# Patient Record
Sex: FEMALE | Race: WHITE | Employment: STUDENT | URBAN - METROPOLITAN AREA
[De-identification: names, ages, dates, MRNs, and addresses within clinical notes are randomized per-mention and may not be internally consistent; named-entity substitution may affect disease eponyms.]

---
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
## 2025-01-01 RX ORDER — FLUOXETINE 10 MG/1
10 CAPSULE ORAL DAILY
Qty: 90 CAPSULE | Refills: 1 | Status: SHIPPED | OUTPATIENT
Start: 2025-01-01

## 2025-02-07 ENCOUNTER — PATIENT MESSAGE (OUTPATIENT)
Dept: FAMILY MEDICINE CLINIC | Facility: CLINIC | Age: 20
End: 2025-02-07

## 2025-04-25 ENCOUNTER — OFFICE VISIT (OUTPATIENT)
Dept: FAMILY MEDICINE CLINIC | Facility: CLINIC | Age: 20
End: 2025-04-25
Payer: COMMERCIAL

## 2025-04-25 VITALS
WEIGHT: 163 LBS | HEIGHT: 65.5 IN | BODY MASS INDEX: 26.83 KG/M2 | SYSTOLIC BLOOD PRESSURE: 120 MMHG | OXYGEN SATURATION: 100 % | DIASTOLIC BLOOD PRESSURE: 70 MMHG | TEMPERATURE: 99 F | HEART RATE: 82 BPM

## 2025-04-25 DIAGNOSIS — E55.9 VITAMIN D DEFICIENCY: ICD-10-CM

## 2025-04-25 DIAGNOSIS — Z00.00 ROUTINE HEALTH MAINTENANCE: Primary | ICD-10-CM

## 2025-04-25 DIAGNOSIS — F41.1 GAD (GENERALIZED ANXIETY DISORDER): ICD-10-CM

## 2025-04-25 DIAGNOSIS — B36.0 TINEA VERSICOLOR: ICD-10-CM

## 2025-04-25 PROCEDURE — 99395 PREV VISIT EST AGE 18-39: CPT | Performed by: FAMILY MEDICINE

## 2025-04-25 PROCEDURE — 99213 OFFICE O/P EST LOW 20 MIN: CPT | Performed by: FAMILY MEDICINE

## 2025-04-25 RX ORDER — FLUOXETINE 10 MG/1
10 CAPSULE ORAL DAILY
Qty: 90 CAPSULE | Refills: 1 | Status: SHIPPED | OUTPATIENT
Start: 2025-04-25

## 2025-04-25 NOTE — PROGRESS NOTES
The following individual(s) verbally consented to be recorded using ambient AI listening technology and understand that they can each withdraw their consent to this listening technology at any point by asking the clinician to turn off or pause the recording:    Patient name: Radha Rikki  Additional names:

## 2025-04-25 NOTE — PROGRESS NOTES
SUBJECTIVE:  Chief Complaint   Patient presents with    Physical     Physical.   Patient having sweating rash between and under breasts.   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  Wondering also what sunscreen to use.     HPI:  History of Present Illness  Radha Brandt is a 19 year old female who presents for a well woman exam and follow-up on chronic medications.    She is planning to travel to [place] in June to work at [employer] for six months and is concerned about staying protected and ensuring her vaccinations are up to date, especially since she will be living with other people.    She has been taking fluoxetine for generalized anxiety disorder, which is working excellently, and her anxiety is well controlled. She receives her medication through [facility], a three-month prescription service, and is considering options for obtaining her medication while away. She experienced a lapse in medication over Christmas, which did not go well, emphasizing the importance of maintaining her regimen.    She is up to date on her varicella and meningitis vaccines, having received the meningitis vaccine in 2023. She inquires about the meningitis B vaccine, which is not required but recommended for those living in college dorms, due to her upcoming living situation.    She experiences a recurrent rash similar to one her sibling, Clark, has. It is described as a heat rash, and she has been using dandruff shampoo in the shower as a treatment, though she has not been leaving it on for the recommended duration. She is considering additional treatment options to manage this condition.    She has a family history of colon cancer, with her mother having had it and multiple relatives on her grandmother's side also affected.    Health Maintenance:  Vaccines: reviewed as below. Indicated today: men B  Immunization History   Administered Date(s) Administered    >=3 YRS FLUZONE OR FLUARIX QUAD PRESERVE FREE  SINGLE DOSE (45426) FLU CLINIC 01/18/2018    Covid-19 Vaccine Pfizer 30 mcg/0.3 ml 05/13/2021, 06/03/2021, 12/17/2021    Covid-19 Vaccine Pfizer Bivalent 30mcg/0.3mL 10/13/2022    DTAP 03/20/2006, 05/16/2006, 07/24/2006, 08/06/2007, 08/06/2007, 03/30/2011    FLULAVAL 6 months & older 0.5 ml Prefilled syringe (51693) 10/19/2018, 09/24/2019, 10/02/2020, 10/13/2022    FLUZONE 6 months and older PFS 0.5 ml ([id number]) 01/18/2018, 10/19/2018, 09/24/2019, 10/02/2020, 11/25/2023    HEP A 12/15/2009, 07/01/2010    HEP B 03/20/2006, 05/16/2006, 07/24/2006    HIB 03/20/2006, 05/16/2006, 07/24/2006, 08/06/2007    Hib, Unspecified Formulation 03/20/2006, 05/16/2006, 07/24/2006, 08/06/2007    Hpv Virus Vaccine 9 Neli Im 05/26/2017, 12/04/2017    IPV 03/20/2006, 05/16/2006, 07/24/2006, 03/30/2011    Influenza 12/13/2007, 11/04/2008, 09/28/2009, 12/14/2010, 11/14/2011, 10/19/2018, 09/24/2019, 10/02/2020, 10/26/2021    Influenza Virus Vaccine, H1N1 01/01/2009    MMR 08/06/2007, 03/20/2011    Meningococcal-Menactra 05/26/2017    Meningococcal-Menveo 2month-55yr 06/22/2023    Moderna Covid-19 Vaccine 50mcg/0.5ml 12yrs+ 11/25/2023    Pneumococcal (Prevnar 13) 03/20/2006, 05/16/2006, 07/24/2006, 01/26/2007, 07/01/2010    Pneumococcal (Prevnar 7) 03/20/2006, 05/16/2006, 07/24/2006, 01/26/2007    TDAP 05/26/2017    Varicella 01/26/2007, 03/30/2011   Pended Date(s) Pended    Covid-19 Vaccine Pfizer Bivalent 30mcg/0.3mL 10/03/2022    FLULAVAL 6 months & older 0.5 ml Prefilled syringe ([id number]) 10/03/2022     Obesity screening: Body mass index is 26.71 kg/m².  Diabetes screening:  No results found for: \"EAG\", \"A1C\"   Hypercholesterolemia screening: due    Depression screen: as above  Osteoporosis: No history of pathologic fractures. No steroid use, smoking, risk of falls, excessive alcohol use.  Cervical Cancer screening: Last Pap: n/a   Colon Cancer screening: Family history of colon cancer? Yes- maternal grandmother; Last colonoscopy: -   Breast  Cancer screening: Family history of breast cancer? no Last mammogram: -   STI: Desires testing for STIs? no  Tobacco use:  reports that she has never smoked. She has never used smokeless tobacco.    ROS: Negative unless stated above    HISTORY:  Past Medical History[1]   Past Surgical History[2]   Family History[3]   Short Social Hx on File[4]     Allergies:  Allergies[5]    OBJECTIVE:  PHYSICAL EXAM:  Vitals:    04/25/25 1027   BP: 120/70   BP Location: Left arm   Patient Position: Sitting   Cuff Size: large   Pulse: 82   Temp: 98.6 °F (37 °C)   TempSrc: Oral   SpO2: 100%   Weight: 163 lb (73.9 kg)   Height: 5' 5.5\" (1.664 m)     Physical Examination: General appearance - alert, well appearing, and in no distress and normal appearing weight  Mental status - alert, oriented to person, place, and time, normal mood, behavior, speech, dress, motor activity, and thought processes  Ears - bilateral TM's and external ear canals normal  Neck - supple, no significant adenopathy  Chest - clear to auscultation, no wheezes, rales or rhonchi, symmetric air entry  Heart - normal rate, regular rhythm, normal S1, S2, no murmurs, rubs, clicks or gallops  Abdomen - soft, nontender, nondistended, no masses or organomegaly  Extremities - peripheral pulses normal, no pedal edema, no clubbing or cyanosis    Results      ASSESSMENT & PLAN:  Radha Brandt is a 19 year old female is here for Physical (Physical.   Patient having sweating rash between and under breasts.   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  Wondering also what sunscreen to use.)    1. Routine health maintenance (Primary)  -     CBC With Differential With Platelet; Future; Expected date: 04/25/2025  -     Comp Metabolic Panel (14); Future; Expected date: 04/25/2025  -     Lipid Panel; Future; Expected date: 04/25/2025  -     TSH W Reflex To Free T4; Future; Expected date: 04/25/2025  -     Vitamin D; Future; Expected date: 04/25/2025  -      Hemoglobin A1C; Future; Expected date: 04/25/2025  -     Ferritin; Future; Expected date: 04/25/2025  -     Vitamin B12; Future; Expected date: 04/25/2025  2. FRANCES (generalized anxiety disorder)  -     FLUoxetine HCl; Take 1 capsule (10 mg total) by mouth daily.  Dispense: 90 capsule; Refill: 1  3. Vitamin D deficiency  -     Vitamin D; Future; Expected date: 04/25/2025  4. Tinea versicolor  -     Fluconazole; Take 2 tablets (300 mg total) by mouth once a week for 2 doses. Repeat in 1 week.  Dispense: 4 tablet; Refill: 0    Assessment & Plan  Wellness Visit  Routine annual wellness visit. Discussed upcoming travel to [place], emphasizing the need for vaccinations and general health maintenance. Recommended mineral-based sunscreen with SPF 35 or higher for daily use, and SPF 50 for direct sun exposure due to fair skin.  - Administer meningitis B vaccine series starting next month, with the second dose upon return from [place]  - Order blood work including kidney function, B12, and iron levels    Tinea Versicolor  Recurrent tinea versicolor presenting as a heat rash. Discussed treatment options including topical and oral therapies. Explained that the condition is a reaction to a yeast organism on the skin, and that recurrence is common.  - Prescribe oral antifungal medication to clear current rash fluconazole 300mg once weekly x 2 weeks)  - Recommend using dandruff shampoo (Selsun Blue or Head and Shoulders) on affected area, leave on for 5 minutes before rinsing, use a couple of times a week    Generalized Anxiety Disorder  Generalized anxiety disorder is well-controlled with current medication regimen of fluoxetine 10 mg daily. Discussed medication management during upcoming travel, including options for medication supply and pharmacy transfer if needed.  - Continue fluoxetine 10 mg oral daily  - Ensure adequate supply of medication during travel, consider local pharmacy transfer if needed    Recording duration: 17  minutes    Call or return to clinic prn if these symptoms worsen or fail to improve as anticipated. RTC in 1 year.   Guera Barbosa DO  4/25/2025 10:43 AM    Note to Patient  The 21st Century Cures Act makes medical notes like these available to patients in the interest of transparency. However, be advised this is a medical document and is intended as lwxe-wz-yglt communication; it is written in medical language and may appear blunt, direct, or contain abbreviations or verbiage that are unfamiliar. Medical documents are intended to carry relevant information, facts as evident, and the clinical opinion of the practitioner.     This report has been produced using speech recognition software and AI generated text, and may contain errors related to grammar, punctuation, spelling, words or phrases unrecognized or not translated appropriately to text; these errors may be referred to the dictating provider for further clarification and/or addendum as needed.         [1] History reviewed. No pertinent past medical history.  [2] History reviewed. No pertinent surgical history.  [3]   Family History  Problem Relation Age of Onset    Heart Disorder Maternal Grandfather 55        MI    Other (Other[other]) Maternal Grandfather         colon polyp, precancerous    Other (Other[other]) Paternal Grandfather         prediabetes, neuropathy    Heart Disorder Paternal Grandfather         heart murmur    Hypertension Paternal Grandfather    [4]   Social History  Socioeconomic History    Marital status: Single   Tobacco Use    Smoking status: Never    Smokeless tobacco: Never   Vaping Use    Vaping status: Never Used   Substance and Sexual Activity    Alcohol use: No    Drug use: No    Sexual activity: Not Currently   Other Topics Concern    Caffeine Concern Yes     Comment:  1 cup tea every two weeks    Exercise Yes     Comment: Fencing twice weekly    Seat Belt Yes     Social Drivers of Health     Food Insecurity: No Food Insecurity  (4/25/2025)    NCSS - Food Insecurity     Worried About Running Out of Food in the Last Year: No     Ran Out of Food in the Last Year: No   Transportation Needs: No Transportation Needs (4/25/2025)    NCSS - Transportation     Lack of Transportation: No   Housing Stability: Not At Risk (4/25/2025)    NCSS - Housing/Utilities     Has Housing: Yes     Worried About Losing Housing: No     Unable to Get Utilities: No   [5] No Known Allergies

## 2025-04-29 RX ORDER — FLUCONAZOLE 150 MG/1
300 TABLET ORAL WEEKLY
Qty: 4 TABLET | Refills: 0 | Status: SHIPPED | OUTPATIENT
Start: 2025-04-29 | End: 2025-05-07

## 2025-08-19 ENCOUNTER — TELEPHONE (OUTPATIENT)
Dept: FAMILY MEDICINE CLINIC | Facility: CLINIC | Age: 20
End: 2025-08-19

## 2025-08-19 DIAGNOSIS — F41.1 GAD (GENERALIZED ANXIETY DISORDER): ICD-10-CM

## 2025-08-20 RX ORDER — FLUOXETINE 10 MG/1
10 CAPSULE ORAL DAILY
Qty: 90 CAPSULE | Refills: 0 | Status: SHIPPED | OUTPATIENT
Start: 2025-08-20

## (undated) NOTE — LETTER
[facility]                                      Department of Human Services                                   Certificate of Child Health Examination       Student's Name  Radha Brandt Birth Date  12/12/2005  Sex  Female Race/Ethnicity   School/Grade Level/ID#  [employer]   Address  [address] [address] Parent/Guardian      Telephone# - Home   Telephone# - Work                              IMMUNIZATIONS:  To be completed by health care provider.  The mo/da/yr for every dose administered is required.  If a specific vaccine is medically contraindicated, a separate written statement must be attached by the health care provider responsible for completing the health examination explaining the medical reason for the contradiction.   VACCINE/DOSE DATE DATE DATE DATE DATE   Diphtheria, Tetanus and Pertussis (DTP or DTap) 3/20/2006 5/16/2006 7/24/2006 8/6/2007 3/30/2011   Tdap 5/26/2017       Td        Pediatric DT        Inactivate Polio (IPV) 3/20/2006 5/16/2006 7/24/2006 3/30/2011    Oral Polio (OPV)        Haemophilus Influenza Type B (Hib) 3/20/2006 5/16/2006 7/24/2006 8/6/2007    Hepatitis B (HB) 3/20/2006 5/16/2006 7/24/2006     Varicella (Chickenpox) 1/26/2007 3/30/2011      Combined Measles, Mumps and Rubella (MMR) 8/6/2007 3/20/2011      Measles (Rubeola)        Rubella (3-day measles)        Mumps        Pneumococcal 5/16/2006 7/24/2006 1/26/2007 7/1/2010    Meningococcal Conjugate 5/26/2017 6/22/2023         RECOMMENDED, BUT NOT REQUIRED  Vaccine/Dose        VACCINE/DOSE DATE DATE DATE DATE DATE DATE   Hepatitis A 12/15/2009 7/1/2010       HPV 5/26/2017 12/4/2017       Influenza 10/19/2018 9/24/2019 10/2/2020 10/26/2021 10/13/2022 11/25/2023   Men B         Covid 5/13/2021 6/3/2021 12/17/2021 10/13/2022 11/25/2023       Other:  Specify Immunization/Adminstered Dates:   Health care provider (MD, DO, APN, PA , school health  professional) verifying above immunization history must sign below.  Signature                                                                                                                                        Title                           Date  6/24/2024   Signature                                                                                                                                              Title                           Date    (If adding dates to the above immunization history section, put your initials by date(s) and sign here.)   ALTERNATIVE PROOF OF IMMUNITY   1.Clinical diagnosis (measles, mumps, hepatits B) is allowed when verified by physician & supported with lab confirmation. Attach copy of lab result.       *MEASLES (Rubeola)  MO/DA/YR        * MUMPS MO/DA/YR       HEPATITIS B   MO/DA/YR        VARICELLA MO/DA/YR           2.  History of varicella (chickenpox) disease is acceptable if verified by health care provider, school health professional, or health official.       Person signing below is verifying  parent/guardian’s description of varicella disease is indicative of past infection and is accepting such hx as documentation of disease.       Date of Disease                                  Signature                                                                         Title                           Date             3.  Lab Evidence of Immunity (check one)    __Measles*       __Mumps *       __Rubella        __Varicella      __Hepatitis B       *Measles diagnosed on/after 7/1/2002 AND mumps diagnosed on/after 7/1/2013 must be confirmed by laboratory evidence   Completion of Alternatives 1 or 3 MUST be accompanied by Labs & Physician Signature:  Physician Statements of Immunity MUST be submitted to ID for review.   Certificates of Rastafari Exemption to Immunizations or Physician Medical Statements of Medical Contraindication are Reviewed and Maintained by the School  Authority.           Student's Name  Radha Brandt Birth Date  12/12/2005  Sex  Female School   Grade Level/ID#  [employer]   HEALTH HISTORY          TO BE COMPLETED AND SIGNED BY PARENT/GUARDIAN AND VERIFIED BY HEALTH CARE PROVIDER    ALLERGIES  (Food, drug, insect, other)  Patient has no known allergies. MEDICATION  (List all prescribed or taken on a regular basis.)    Current Outpatient Medications:     FLUoxetine 10 MG Oral Cap, Take 1 capsule (10 mg total) by mouth daily., Disp: 90 capsule, Rfl: 1    Cyanocobalamin (VITAMIN B 12) 500 MCG Oral Tab, Take 1,000 mcg by mouth., Disp: , Rfl:     cholecalciferol 25 MCG (1000 UT) Oral Tab, Take 1.5 tablets (1,500 Units total) by mouth., Disp: , Rfl:     Multiple Vitamin (MULTI VITAMIN DAILY OR), Take 1 tablet by mouth daily., Disp: , Rfl:    Diagnosis of asthma?  Child wakes during the night coughing   Yes   No    Yes   No    Loss of function of one of paired organs? (eye/ear/kidney/testicle)   Yes   No      Birth Defects?  Developmental delay?   Yes   No    Yes   No  Hospitalizations?  When?  What for?   Yes   No    Blood disorders?  Hemophilia, Sickle Cell, Other?  Explain.   Yes   No  Surgery?  (List all.)  When?  What for?   Yes   No    Diabetes?   Yes   No  Serious injury or illness?   Yes   No    Head Injury/Concussion/Passed out?   Yes   No  TB skin text positive (past/present)?   Yes   No *If yes, refer to local    Seizures?  What are they like?   Yes   No  TB disease (past or present)?   Yes   No *health department   Heart problem/Shortness of breath?   Yes   No  Tobacco use (type, frequency)?   Yes   No    Heart murmur/High blood pressure?   Yes   No  Alcohol/Drug use?   Yes   No    Dizziness or chest pain with exercise?   Yes   No  Fam hx sudden death < age 50 (Cause?)    Yes   No    Eye/Vision problems?  Yes  No   Glasses  Yes   No  Contacts  Yes    No   Last eye exam___  Other concerns? (crossed eye, drooping lids, squinting, difficulty reading) Dental:   ____Braces    ____Bridge    ____Plate    ____Other  Other concerns?     Ear/Hearing problems?   Yes   No  Information may be shared with appropriate personnel for health /educational purposes.   Bone/Joint problem/injury/scoliosis?   Yes   No  Parent/Guardian Signature                                          Date     PHYSICAL EXAMINATION REQUIREMENTS    Entire section below to be completed by MD//APN/PA       PHYSICAL EXAMINATION REQUIREMENTS (head circumference if <2-3 years old):   /76   Pulse 109   Ht 5' 5.47\" (1.663 m)   Wt 154 lb (69.9 kg)   SpO2 99%   BMI 25.26 kg/m²     DIABETES SCREENING  BMI>85% age/sex  No And any two of the following:  Family History No    Ethnic Minority  No          Signs of Insulin Resistance (hypertension, dyslipidemia, polycystic ovarian syndrome, acanthosis nigricans)    No           At Risk  No   Lead Risk Questionnaire  Req'd for children 6 months thru 6 yrs enrolled in licensed or public school operated day care, ,  nursery school and/or  (blood test req’d if resides in [facility] or high risk zip)   Questionnaire Administered:Yes   Blood Test Indicated:No   Blood Test Date                 Result:                 TB Skin OR Blood Test   Rec.only for children in high-risk groups incl. children immunosuppressed due to HIV infection or other conditions, frequent travel to or born in high prevalence countries or those exposed to adults in high-risk categories.  See CDCguidelines.  http://www.cdc.gov/tb/publications/factsheets/testing/TB_testing.htm.      No Test Needed        Skin Test:     Date Read                  /      /              Result:                     mm    ______________                         Blood Test:   Date Reported          /      /              Result:                  Value ______________               LAB TESTS (Recommended) Date Results  Date Results   Hemoglobin or Hematocrit   Sickle Cell  (when indicated)     Urinalysis    Developmental Screening Tool     SYSTEM REVIEW Normal Comments/Follow-up/Needs  Normal Comments/Follow-up/Needs   Skin Yes  Endocrine Yes    Ears Yes                      Screen result: Gastrointestinal Yes    Eyes Yes     Screen result:   Genito-Urinary Yes  LMP   Nose Yes  Neurological Yes    Throat Yes  Musculoskeletal Yes    Mouth/Dental Yes  Spinal examination Yes    Cardiovascular/HTN Yes  Nutritional status Yes    Respiratory Yes                   Diagnosis of Asthma: No Mental Health Yes        Currently Prescribed Asthma Medication:            Quick-relief  medication (e.g. Short Acting Beta Antagonist): No          Controller medication (e.g. inhaled corticosteroid):   No Other   NEEDS/MODIFICATIONS required in the school setting  None DIETARY Needs/Restrictions     None   SPECIAL INSTRUCTIONS/DEVICES e.g. safety glasses, glass eye, chest protector for arrhythmia, pacemaker, prosthetic device, dental bridge, false teeth, athleticsupport/cup     None   MENTAL HEALTH/OTHER   Is there anything else the school should know about this student?  No  If you would like to discuss this student's health with school or school health professional, check title:  __Nurse  __Teacher  __Counselor  __Principal   EMERGENCY ACTION  needed while at school due to child's health condition (e.g., seizures, asthma, insect sting, food, peanut allergy, bleeding problem, diabetes, heart problem)?  No  If yes, please describe.     On the basis of the examination on this day, I approve this child's participation in        (If No or Modified, please attach explanation.)  PHYSICAL EDUCATION    Yes      INTERSCHOLASTIC SPORTS   Yes   Physician/Advanced Practice Nurse/Physician Assistant performing examination  Print Name  Guera Barbosa DO                                            Signature                                                                                       Date  6/24/2024     Address/Phone  [facility]  [facility], [place] [address] [address] [place] [id number]  [phone]   Rev 11/15                                                                    Printed by the Authority of the [facility]

## (undated) NOTE — MR AVS SNAPSHOT
[facility], [address]  [phone]               Thank you for choosing us for your health care visit with Reshma Malik MD.  We are glad to serve you and happy to provide you with this summ Here are some topics you, your child, and the healthcare provider may want to discuss during this visit:  · School performance. How is your child doing in school? Is homework finished on time? Does your child stay organized?  These are skills you can help w breast often starts to grow before the other. This is normal. Hair begins to grow in the pubic area, under the arms, and on the legs. Around 2 years after breasts begin to grow, a girl will start having monthly periods (menstruation).  To help prepare your child has a TV, computer, or video game console in the bedroom, consider replacing it with a music player. For many kids, dancing and singing are fun ways to get moving. · Limit sugary drinks.  Soda, juice, and sports drinks lead to unhealthy weight gain a · If your child has a cell phone, make sure it’s turned off at night. · Don’t let your child go to sleep very late or sleep in on weekends. This can disrupt sleep patterns and make it harder to sleep on school nights.   · Remind your child to brush and danielle and to the staff at your child’s school. The healthcare provider may also be able to offer advice.   Vaccinations  Based on recommendations from the American Association of Pediatrics, at this visit your child may receive the following vaccinations:  · Ignacia No Known Allergies                Today's Vital Signs     BP Pulse    90/70 mmHg (9 %, Z = -1.35 / 78 %, Z = 0.76*) 74    Temp Height    98.7 °F (37.1 °C) (Oral) 57\" (37 %*, Z = -0.33)    Weight BMI    82 lb (39 %*, Z = -0.27) 17.74 kg/m2 (50 %*, Z = 0.0 o 5 servings of fruits and vegetables a day  o 4 servings of water a day  o 3 servings of low-fat dairy a day  o 2 or less hours of screen time a day  o 1 or more hours of physical activity a day    To help children live healthy active lives, parents can:

## (undated) NOTE — LETTER
?   PREPARTICIPATION PHYSICAL EVALUATION   MEDICAL ELIGIBILITY FORM  [x] Medically eligible for all sports without restrictions   [] Medically eligible for all sports without restriction with recommendations for further evaluation or treatment     []Medical a)      Fever or chills Yes  No    b)      Cough Yes  No    c)       Shortness of breath or difficulty breathing Yes  No    d)      Fatigue Yes  No    e)      Muscle or body aches Yes  No    f)       Headache Yes  No    g)      New loss of taste Sources:  • Interim Guidance on the Preparticipation Physical Examinatio. .. : Clinical Journal of Sport Medicine ([email or web address])  • Supplemental COVID?19 Questions ([email or web address])  • COVID?19 Interim Guidance: Return to Sports and Physical Activity (aap.[email or web address]) 2.   Has a provider ever denied or restricted your participation in sports for any reason? [] [x] 3. Do you have any ongoing medical issues or recent illnesses? [] [x]   HEART HEALTH QUESTIONS ABOUT YOU Yes No 4.    Have you ever passed out or nearly pas QUESTIONS Yes No   16. Do you cough, wheeze, or have difficulty breathing during or after exercise? [] [x]   17. Are you missing a kidney, an eye, a testicle (males), your spleen, or any other organ? [] [x]   18.    Do you have groin or testicle pain or ______________________________________________________________________________________________________________________________________________________________________________________________________________________________________________________________ Corrected: [x] Y []  N   MEDICAL NORMAL ABNORMAL FINDINGS   Appearance  • Marfan stigmata (kyphoscoliosis, high-arched palate, pectus excavatum, arachnodactyly, hyperlaxity, myopia, mitral valve prolapse [MVP], and aortic insufficiency)   [x]    []       E

## (undated) NOTE — LETTER
Name:  Ward Ana Year:  8th Grade Class: Student ID No.:   Address:  [address] [place] [address] Phone:  [phone] (home)  :  15year old   Name Relationship [address] Work Phone Home Phone Mobile Phone   1.  DEAR syndrome, arrhythmogenic right ventricular cardiomyopathy, long QT syndrome, short QT syndrome, Brugada syndrome, or catecholaminergic polymorphic ventricular tachycardia? No   15.  Does anyone in your family have a heart problem, pacemaker, or implanted de 35. Have you ever had a hit or blow to the head that caused confusion, prolonged headache, or memory problems? No   36. Do you have a history of seizure disorder? No   37. Do you have headaches with exercise? No   38.  Have you ever had numbness, tingling, based on CDC (Girls, 2-20 Years) BMI-for-age based on BMI available as of 2/11/2019. female    Vision: [address] FINDINGS   Appearance:  Marfan stigmata (kyphoscoliosis, high-arched palate, pectus (This section for high school students only)   2620-5801 school term    As a prerequisite to participation in [employer] athletic activities, we agree that I/our student will not use performance-enhancing substances as defined in the IH[employer] Performance-Enhancing S

## (undated) NOTE — Clinical Note
[facility] [employer] of Child Health Examination       Student's Name  Daphnie Living Birth Da Title                           Date    (If adding dates to the above immunization history section, put your initials by date(s) and sign here.)   ALTERNATIVE PROOF OF IMMUNITY   1 (List all prescribed or taken on a regular basis.)    Current outpatient prescriptions:   •  Multiple Vitamin (MULTI VITAMIN DAILY OR), Take 1 tablet by mouth daily. , Disp: , Rfl:    Diagnosis of asthma?   Child wakes during the night coughing  No   No    L Signs of Insulin Resistance (hypertension, dyslipidemia, polycystic ovarian syndrome, acanthosis nigricans)       No                    At Risk  No   Lead Risk Questionnaire  Req'd for children 6 months thru 6 yrs enrolled in licensed or public [facility] NEEDS/MODIFICATIONS required in the school setting  None DIETARY Needs/Restrictions     None   SPECIAL INSTRUCTIONS/DEVICES e.g. safety glasses, glass eye, chest protector for arrhythmia, pacemaker, prosthetic device, dental bridge, false teeth, athleticsu

## (undated) NOTE — LETTER
Name:  Cassie Frances Year:  7th   Class: Student ID No.:   Address:  [place] [address] Phone:  [phone] (home)  :  15year old   Name Relationship [address] Work Phone Home Phone Mobile Phone   1.  [place], [place] unexpected or unexplained sudden death before age 48? (including drowning, unexplained car accident, or sudden infant death syndrome)? {y/N:1768::\"No\"}   14.  Does anyone in your family have hypertrophic cardiomyopathy, Marfan syndrome, arrhythmogenic rig 34. Were you born without or are you missing a kidney, eye, testicle (males), spleen, or any other organ? {y/N:1768::\"No\"}   30. Do you have a groin pain or a painful bulge or hernia in the groin area? {y/N:1768::\"No\"}   31.  Have you had infectious mon {y/N:1768::\"No\"}   FEMALES ONLY    53. Have you ever had a menstrual period? {N/A:2593}   54. How old were you when you had your first period? 55. How many periods have you had in the last 12 months?     Explain \"yes\" answers here:   ________________ *Considered  exam if in private setting. Having third party present is recommended. *Consider cognitive evaluation or baseline neuropsychiatric testing if a history of significant concussion.   On the basis of the examination on this day, I approve this held confidential to the extent required by law. We understand that failure to provide accurate and truthful information could subject me/our student to penalties as determined by IHSA.      A complete list of the current IHSA Banned Substance Classes can b

## (undated) NOTE — LETTER
[place] [id number] Examination       Student's Name  [place], Rosemarie Gross Birth Signature                                                                                                                                   Title                           Date     Signature Female School   Grade Level/ID#  9th Grade   HEALTH HISTORY          TO BE COMPLETED AND SIGNED BY PARENT/GUARDIAN AND VERIFIED BY HEALTH CARE PROVIDER    ALLERGIES  (Food, drug, insect, other)  Patient has no known allergies.  MEDICATION  (List all prescri PHYSICAL EXAMINATION REQUIREMENTS    Entire section below to be completed by MD//APN/PA       PHYSICAL EXAMINATION REQUIREMENTS (head circumference if <33 years old):   /60   Pulse 80   Temp 97.7 °F (36.5 °C) (Temporal)   Resp 16   Ht 65.25\"   Wt Nose Yes  Neurological Yes    Throat Yes  Musculoskeletal Yes    Mouth/Dental Yes  Spinal examination Yes    Cardiovascular/HTN Yes  Nutritional status Yes    Respiratory Yes                   Diagnosis of Asthma: No Mental Health Yes        Currently Pres